# Patient Record
Sex: MALE | Race: WHITE | HISPANIC OR LATINO | Employment: FULL TIME | URBAN - METROPOLITAN AREA
[De-identification: names, ages, dates, MRNs, and addresses within clinical notes are randomized per-mention and may not be internally consistent; named-entity substitution may affect disease eponyms.]

---

## 2023-03-31 ENCOUNTER — HOSPITAL ENCOUNTER (EMERGENCY)
Facility: HOSPITAL | Age: 23
Discharge: HOME | End: 2023-03-31
Attending: EMERGENCY MEDICINE

## 2023-03-31 VITALS
HEART RATE: 77 BPM | OXYGEN SATURATION: 98 % | DIASTOLIC BLOOD PRESSURE: 64 MMHG | WEIGHT: 162.69 LBS | SYSTOLIC BLOOD PRESSURE: 123 MMHG | TEMPERATURE: 98 F | RESPIRATION RATE: 16 BRPM

## 2023-03-31 DIAGNOSIS — S61.211A LACERATION OF LEFT INDEX FINGER WITHOUT FOREIGN BODY WITHOUT DAMAGE TO NAIL, INITIAL ENCOUNTER: Primary | ICD-10-CM

## 2023-03-31 PROCEDURE — 63600175 PHARM REV CODE 636 W HCPCS: Performed by: EMERGENCY MEDICINE

## 2023-03-31 PROCEDURE — 99283 EMERGENCY DEPT VISIT LOW MDM: CPT | Mod: ,,, | Performed by: EMERGENCY MEDICINE

## 2023-03-31 PROCEDURE — 90715 TDAP VACCINE 7 YRS/> IM: CPT | Performed by: EMERGENCY MEDICINE

## 2023-03-31 PROCEDURE — 99283 PR EMERGENCY DEPT VISIT,LEVEL III: ICD-10-PCS | Mod: ,,, | Performed by: EMERGENCY MEDICINE

## 2023-03-31 PROCEDURE — 90471 IMMUNIZATION ADMIN: CPT | Performed by: EMERGENCY MEDICINE

## 2023-03-31 PROCEDURE — 99284 EMERGENCY DEPT VISIT MOD MDM: CPT

## 2023-03-31 RX ADMIN — TETANUS TOXOID, REDUCED DIPHTHERIA TOXOID AND ACELLULAR PERTUSSIS VACCINE, ADSORBED 0.5 ML: 5; 2.5; 8; 8; 2.5 SUSPENSION INTRAMUSCULAR at 04:03

## 2023-03-31 NOTE — Clinical Note
"Frankie Oliveros" Martínez was seen and treated in our emergency department on 3/31/2023.  He may return to work on 04/02/2023.       If you have any questions or concerns, please don't hesitate to call.      DR Truong/ Marcus, WINSOME    "

## 2023-03-31 NOTE — ED NOTES
Wound to left index finger cleansed w/ sterile NS , telpha dressing applied and  sterile guaze w/ telpha tape.  Dressing supplies given to pt w/ instructions.

## 2023-03-31 NOTE — Clinical Note
"Frankie Oliveros" Martínez was seen and treated in our emergency department on 3/31/2023.  He may return to work on 04/03/2023.       If you have any questions or concerns, please don't hesitate to call.      DR Truong/ Marcus, WINSOME    "

## 2023-03-31 NOTE — ED PROVIDER NOTES
Encounter Date: 3/31/2023       History     Chief Complaint   Patient presents with    Laceration     To left pointer finger. Cut with a knife     Source of history: Patient.  History limited by a language barrier. Translation assistance provided by ED staff.    HPI:  The patient is a 23-year-old with no chronic medical conditions who complains of a laceration to the volar surface of his distal left index finger. He accidentally injured the digit with a knife at work just before arrival. He has associated mild pain. Bleeding was controlled with applying pressure. He denies motor loss and loss of sensation. He denies other injuries. He does not know when he last received tetanus vaccine.      Review of patient's allergies indicates:  No Known Allergies  No past medical history on file.  No past surgical history on file.  No family history on file.     Review of Systems  See HPI.    Physical Exam     Initial Vitals [03/31/23 1539]   BP Pulse Resp Temp SpO2   133/75 97 18 98.3 °F (36.8 °C) 96 %      MAP       --         Physical Exam    Nursing note and vitals reviewed.  Cardiovascular:            Pulses:       Radial pulses are 2+ on the left side.     Skin:   Left index finger: 1 cm superficial laceration to distal phalanx on volar surface. 1 mm depth. No separation. No bleeding. Mild tenderness. No nail involvement.       ED Course   Procedures  Labs Reviewed   HIV 1 / 2 ANTIBODY   HEPATITIS C ANTIBODY          Imaging Results    None          Medications   Tdap (BOOSTRIX) vaccine injection 0.5 mL (0.5 mLs Intramuscular Given 3/31/23 1644)     Medical Decision Making:     MDM:   Superficial finger laceration. Sutures not needed. Wound cleaned/dressed. Tetanus vaccine administered.                        Clinical Impression:   Final diagnoses:  [S61.211A] Laceration of left index finger without foreign body without damage to nail, initial encounter (Primary)        ED Disposition Condition    Discharge Stable           ED Prescriptions    None       Follow-up Information       Follow up With Specialties Details Why Contact Info    Médico de atención primaria    Leilani un seguimiento con un médico de atención primaria en los próximos días para iveth nueva verificación.    La dionna de emergencias   Regrese a la dionna de emergencias por síntomas empeorados o por cualquier otra inquietud.              Jose Luis Truong III, MD  03/31/23 6722     7.8

## 2023-03-31 NOTE — ED TRIAGE NOTES
Frankie Soliz, a 23 y.o. male presents to the ED w/ complaint of cut to L index finger.    Pt states this happened today at 14:30. He cut himself with a knife. Pt denies bleeding at this time.     Triage note:  Chief Complaint   Patient presents with    Laceration     To left pointer finger. Cut with a knife     Review of patient's allergies indicates:  Not on File  No past medical history on file.